# Patient Record
Sex: FEMALE | Race: WHITE | URBAN - METROPOLITAN AREA
[De-identification: names, ages, dates, MRNs, and addresses within clinical notes are randomized per-mention and may not be internally consistent; named-entity substitution may affect disease eponyms.]

---

## 2021-01-07 ENCOUNTER — HOSPITAL ENCOUNTER (OUTPATIENT)
Facility: CLINIC | Age: 46
End: 2021-01-07
Attending: OBSTETRICS & GYNECOLOGY | Admitting: OBSTETRICS & GYNECOLOGY

## 2021-01-07 DIAGNOSIS — N92.1 METRORRHAGIA: ICD-10-CM

## 2021-01-07 DIAGNOSIS — N70.11 CHRONIC SALPINGITIS: ICD-10-CM

## 2021-01-07 DIAGNOSIS — N83.291 COMPLEX CYST OF RIGHT OVARY: ICD-10-CM

## 2021-01-29 DIAGNOSIS — Z11.59 ENCOUNTER FOR SCREENING FOR OTHER VIRAL DISEASES: ICD-10-CM

## 2021-02-12 ENCOUNTER — HOSPITAL PATHOLOGY (OUTPATIENT)
Dept: OTHER | Facility: CLINIC | Age: 46
End: 2021-02-12

## 2021-02-16 LAB — COPATH REPORT: NORMAL

## 2025-07-22 ENCOUNTER — HOSPITAL ENCOUNTER (EMERGENCY)
Facility: CLINIC | Age: 50
Discharge: HOME | End: 2025-07-23
Attending: EMERGENCY MEDICINE
Payer: COMMERCIAL

## 2025-07-22 ENCOUNTER — NURSE TRIAGE (OUTPATIENT)
Dept: FAMILY MEDICINE | Facility: CLINIC | Age: 50
End: 2025-07-22
Payer: COMMERCIAL

## 2025-07-22 DIAGNOSIS — R07.9 CHEST PAIN, UNSPECIFIED TYPE: ICD-10-CM

## 2025-07-22 PROCEDURE — 82247 BILIRUBIN TOTAL: CPT | Performed by: EMERGENCY MEDICINE

## 2025-07-22 PROCEDURE — 85379 FIBRIN DEGRADATION QUANT: CPT | Performed by: EMERGENCY MEDICINE

## 2025-07-22 PROCEDURE — 36415 COLL VENOUS BLD VENIPUNCTURE: CPT | Performed by: EMERGENCY MEDICINE

## 2025-07-22 PROCEDURE — 82435 ASSAY OF BLOOD CHLORIDE: CPT | Performed by: EMERGENCY MEDICINE

## 2025-07-22 PROCEDURE — 85041 AUTOMATED RBC COUNT: CPT | Performed by: EMERGENCY MEDICINE

## 2025-07-22 PROCEDURE — 99285 EMERGENCY DEPT VISIT HI MDM: CPT | Mod: 25 | Performed by: EMERGENCY MEDICINE

## 2025-07-22 PROCEDURE — 83690 ASSAY OF LIPASE: CPT | Performed by: EMERGENCY MEDICINE

## 2025-07-22 PROCEDURE — 84484 ASSAY OF TROPONIN QUANT: CPT | Performed by: EMERGENCY MEDICINE

## 2025-07-22 ASSESSMENT — COLUMBIA-SUICIDE SEVERITY RATING SCALE - C-SSRS
2. HAVE YOU ACTUALLY HAD ANY THOUGHTS OF KILLING YOURSELF IN THE PAST MONTH?: NO
1. IN THE PAST MONTH, HAVE YOU WISHED YOU WERE DEAD OR WISHED YOU COULD GO TO SLEEP AND NOT WAKE UP?: NO
6. HAVE YOU EVER DONE ANYTHING, STARTED TO DO ANYTHING, OR PREPARED TO DO ANYTHING TO END YOUR LIFE?: NO

## 2025-07-22 NOTE — TELEPHONE ENCOUNTER
Triage Patient Outreach    Attempt # 1    Was call answered?  No.  Left voicemail to return call to [facility]    Mely Karimi RN

## 2025-07-22 NOTE — TELEPHONE ENCOUNTER
"Called patient and triaged her symptoms. She stated that she has been having constant chest pain for about a week and rated it 3/10. She stated the pain is a dull pain behind the sternum. She denies having GERD or having symptoms of GERD. She denied having any other symptoms. Patient was advised to call 911 right away. Patient stated that she does not want to do that because of the cost. Educated the patient on why we recommend calling 911. She stated that she will talk to her spouse regarding this. Appointment for tomorrow was cancelled.     Reason for Disposition   Chest pain lasting longer than 5 minutes and ANY of the following:         Pain is crushing, pressure-like, or heavy         Over 44 years old          Over 30 years old and one cardiac risk factor (e.g diabetes, high blood pressure, high cholesterol, smoker, or family history of heart disease)         History of heart disease (e.g. angina, heart attack, heart failure, bypass surgery, takes nitroglycerin)    Additional Information   Negative: Passed out (i.e., lost consciousness, collapsed and was not responding)   Negative: Shock suspected (e.g., cold/pale/clammy skin, too weak to stand, low BP, rapid pulse)   Negative: SEVERE difficulty breathing (e.g., struggling for each breath, speaks in single words)   Negative: Difficult to awaken or acting confused (e.g., disoriented, slurred speech)    Answer Assessment - Initial Assessment Questions  1. LOCATION: \"Where does it hurt?\"          Behind the sternum     2. RADIATION: \"Does the pain go anywhere else?\" (e.g., into neck, jaw, arms, back)        No    3. ONSET: \"When did the chest pain begin?\" (Minutes, hours or days)         A week and has not gotten worse    4. PATTERN: \"Does the pain come and go, or has it been constant since it started?\"  \"Does it get worse with exertion?\"         Constant dull ache    5. DURATION: \"How long does it last\" (e.g., seconds, minutes, hours)        A week    6. " "SEVERITY: \"How bad is the pain?\"  (e.g., Scale 1-10; mild, moderate, or severe)     - MILD (1-3): doesn't interfere with normal activities      - MODERATE (4-7): interferes with normal activities or awakens from sleep     - SEVERE (8-10): excruciating pain, unable to do any normal activities          3/10    7. CARDIAC RISK FACTORS: \"Do you have any history of heart problems or risk factors for heart disease?\" (e.g., angina, prior heart attack; diabetes, high blood pressure, high cholesterol, smoker, or strong family history of heart disease)        None    8. PULMONARY RISK FACTORS: \"Do you have any history of lung disease?\"  (e.g., blood clots in lung, asthma, emphysema, birth control pills)        None    9. CAUSE: \"What do you think is causing the chest pain?\"        Unsure    10. OTHER SYMPTOMS: \"Do you have any other symptoms?\" (e.g., dizziness, nausea, vomiting, sweating, fever, difficulty breathing, cough)          None    11. PREGNANCY: \"Is there any chance you are pregnant?\" \"When was your last menstrual period?\"          No    Protocols used: Chest Pain-A-[facility]    "

## 2025-07-22 NOTE — TELEPHONE ENCOUNTER
Triage Patient Outreach    Attempt # 2    Was call answered?  No.  Left voicemail to return call to [facility]    Mely Karimi RN

## 2025-07-22 NOTE — TELEPHONE ENCOUNTER
Patient is on my schedule tomorrow for chest pain. Please triage  Elba Zavala PA-C on 7/22/2025 at 10:27 AM

## 2025-07-23 ENCOUNTER — APPOINTMENT (OUTPATIENT)
Dept: GENERAL RADIOLOGY | Facility: CLINIC | Age: 50
End: 2025-07-23
Attending: EMERGENCY MEDICINE
Payer: COMMERCIAL

## 2025-07-23 VITALS
DIASTOLIC BLOOD PRESSURE: 82 MMHG | SYSTOLIC BLOOD PRESSURE: 115 MMHG | RESPIRATION RATE: 13 BRPM | WEIGHT: 125 LBS | HEART RATE: 84 BPM | OXYGEN SATURATION: 99 % | TEMPERATURE: 96.7 F | BODY MASS INDEX: 19.58 KG/M2

## 2025-07-23 LAB
ALBUMIN SERPL BCG-MCNC: 4.4 G/DL (ref 3.5–5.2)
ALP SERPL-CCNC: 59 U/L (ref 40–150)
ALT SERPL W P-5'-P-CCNC: 15 U/L (ref 0–50)
ANION GAP SERPL CALCULATED.3IONS-SCNC: 13 MMOL/L (ref 7–15)
AST SERPL W P-5'-P-CCNC: 23 U/L (ref 0–45)
ATRIAL RATE - MUSE: 76 BPM
BASOPHILS # BLD AUTO: 0.1 10E3/UL (ref 0–0.2)
BASOPHILS NFR BLD AUTO: 1 %
BILIRUB DIRECT SERPL-MCNC: <0.08 MG/DL (ref 0–0.3)
BILIRUB SERPL-MCNC: 0.2 MG/DL
BUN SERPL-MCNC: 15.6 MG/DL (ref 6–20)
CALCIUM SERPL-MCNC: 9.5 MG/DL (ref 8.8–10.4)
CHLORIDE SERPL-SCNC: 102 MMOL/L (ref 98–107)
CREAT SERPL-MCNC: 0.86 MG/DL (ref 0.51–0.95)
D DIMER PPP FEU-MCNC: <0.27 UG/ML FEU (ref 0–0.5)
DIASTOLIC BLOOD PRESSURE - MUSE: NORMAL MMHG
EGFRCR SERPLBLD CKD-EPI 2021: 82 ML/MIN/1.73M2
EOSINOPHIL # BLD AUTO: 0.2 10E3/UL (ref 0–0.7)
EOSINOPHIL NFR BLD AUTO: 3 %
ERYTHROCYTE [DISTWIDTH] IN BLOOD BY AUTOMATED COUNT: 12 % (ref 10–15)
GLUCOSE SERPL-MCNC: 133 MG/DL (ref 70–99)
HCO3 SERPL-SCNC: 25 MMOL/L (ref 22–29)
HCT VFR BLD AUTO: 37.7 % (ref 35–47)
HGB BLD-MCNC: 12.7 G/DL (ref 11.7–15.7)
HOLD SPECIMEN: NORMAL
IMM GRANULOCYTES # BLD: 0 10E3/UL
IMM GRANULOCYTES NFR BLD: 0 %
INTERPRETATION ECG - MUSE: NORMAL
LIPASE SERPL-CCNC: 49 U/L (ref 13–60)
LYMPHOCYTES # BLD AUTO: 3 10E3/UL (ref 0.8–5.3)
LYMPHOCYTES NFR BLD AUTO: 43 %
MCH RBC QN AUTO: 30.6 PG (ref 26.5–33)
MCHC RBC AUTO-ENTMCNC: 33.7 G/DL (ref 31.5–36.5)
MCV RBC AUTO: 91 FL (ref 78–100)
MONOCYTES # BLD AUTO: 0.4 10E3/UL (ref 0–1.3)
MONOCYTES NFR BLD AUTO: 6 %
NEUTROPHILS # BLD AUTO: 3.2 10E3/UL (ref 1.6–8.3)
NEUTROPHILS NFR BLD AUTO: 47 %
NRBC # BLD AUTO: 0 10E3/UL
NRBC BLD AUTO-RTO: 0 /100
P AXIS - MUSE: 46 DEGREES
PLATELET # BLD AUTO: 255 10E3/UL (ref 150–450)
POTASSIUM SERPL-SCNC: 3.9 MMOL/L (ref 3.4–5.3)
PR INTERVAL - MUSE: 148 MS
PROT SERPL-MCNC: 7.1 G/DL (ref 6.4–8.3)
QRS DURATION - MUSE: 82 MS
QT - MUSE: 368 MS
QTC - MUSE: 414 MS
R AXIS - MUSE: 91 DEGREES
RBC # BLD AUTO: 4.15 10E6/UL (ref 3.8–5.2)
SODIUM SERPL-SCNC: 140 MMOL/L (ref 135–145)
SYSTOLIC BLOOD PRESSURE - MUSE: NORMAL MMHG
T AXIS - MUSE: 87 DEGREES
TROPONIN T SERPL HS-MCNC: <6 NG/L
VENTRICULAR RATE- MUSE: 76 BPM
WBC # BLD AUTO: 6.8 10E3/UL (ref 4–11)

## 2025-07-23 PROCEDURE — 71046 X-RAY EXAM CHEST 2 VIEWS: CPT

## 2025-07-23 PROCEDURE — 250N000013 HC RX MED GY IP 250 OP 250 PS 637: Performed by: EMERGENCY MEDICINE

## 2025-07-23 RX ORDER — KETOROLAC TROMETHAMINE 15 MG/ML
10 INJECTION, SOLUTION INTRAMUSCULAR; INTRAVENOUS ONCE
Status: DISCONTINUED | OUTPATIENT
Start: 2025-07-23 | End: 2025-07-23

## 2025-07-23 RX ORDER — IBUPROFEN 600 MG/1
600 TABLET, FILM COATED ORAL ONCE
Status: COMPLETED | OUTPATIENT
Start: 2025-07-23 | End: 2025-07-23

## 2025-07-23 RX ORDER — ACETAMINOPHEN 500 MG
1000 TABLET ORAL ONCE
Status: COMPLETED | OUTPATIENT
Start: 2025-07-23 | End: 2025-07-23

## 2025-07-23 RX ADMIN — ACETAMINOPHEN 1000 MG: 500 TABLET, FILM COATED ORAL at 03:20

## 2025-07-23 RX ADMIN — IBUPROFEN 600 MG: 600 TABLET ORAL at 03:24

## 2025-07-23 ASSESSMENT — ACTIVITIES OF DAILY LIVING (ADL)
ADLS_ACUITY_SCORE: 41

## 2025-07-23 NOTE — ED TRIAGE NOTES
Pt reports chest pain for past two days, described as dull ache in the middle of her chest. Pt notes SOB that started today, worse when lying down or notices it's hard to take a deep breath.      Triage Assessment (Adult)       Row Name 07/22/25 4027          Triage Assessment    Airway WDL WDL        Respiratory WDL    Respiratory WDL X;all     Rhythm/Pattern, Respiratory shortness of breath        Cardiac WDL    Cardiac WDL X;chest pain        Chest Pain Assessment    Chest Pain Location midsternal     Character aching        Peripheral/Neurovascular WDL    Peripheral Neurovascular WDL WDL        Cognitive/Neuro/Behavioral WDL    Cognitive/Neuro/Behavioral WDL WDL

## 2025-07-23 NOTE — DISCHARGE INSTRUCTIONS
As we discussed, the exact cause of your chest discomfort is not clear.  At this time I do not see any emergent concerns.  Please follow-up with your doctor in clinic for a recheck, and please return to the ER if you have worsening pain, difficulty breathing, or any further concerns.

## 2025-07-23 NOTE — ED PROVIDER NOTES
Emergency Department Note      History of Present Illness   Chief Complaint   Chest Pain and Shortness of Breath      HPI   Iris Lopez is a 50 year old female who presents to the ED with her family for evaluation of chest pain and shortness of breath. She reports constant centralized chest pain for the past week but had worsening pain tonight along with a slight cough. She states having a sharp episode of chest pain a few days before her onset but it subsided on it's own. Her pain currently is a dull achy pain with a rating of 4/10. She has more pain when she takes a deep breath. Patient has family history of PE. She took Excedrin and Aspirin last week. No medications today. No changes with eating or drinking. No sore throat or abdominal pain. No family history of heart disease.     Independent Historian   None      Past Medical History   Medical History and Problem List   No pertinent past medical history     Medications   The patient is not currently taking any prescribed medications.  Physical Exam     Patient Vitals for the past 24 hrs:   BP Temp Temp src Pulse Resp SpO2 Weight   07/23/25 0434 115/82 -- -- 84 13 99 % --   07/23/25 0222 -- -- -- 77 15 95 % --   07/23/25 0220 124/79 -- -- 69 -- -- --   07/22/25 2344 136/83 (!) 96.7  F (35.9  C) Temporal 80 20 100 % 56.7 kg (125 lb)     Physical Exam  General: Does not appear in acute distress  Head: No signs of trauma.   CV: Normal rate and regular rhythm.    Resp: Effort normal and breath sounds normal. No respiratory distress.   GI: Soft. There is no tenderness.  No rebound or guarding.  Normal bowel sounds.  No CVA tenderness.  MSK: Normal range of motion. no edema. No Calf tenderness.  Neuro: The patient is alert and oriented. Speech normal.  Skin: Skin is warm and dry. No rash noted.   Psych: normal mood and affect. behavior is normal.       Diagnostics   Lab Results   Labs Ordered and Resulted from Time of ED Arrival to Time of ED Departure   BASIC  METABOLIC PANEL - Abnormal       Result Value    Sodium 140      Potassium 3.9      Chloride 102      Carbon Dioxide (CO2) 25      Anion Gap 13      Urea Nitrogen 15.6      Creatinine 0.86      GFR Estimate 82      Calcium 9.5      Glucose 133 (*)    TROPONIN T, HIGH SENSITIVITY - Normal    Troponin T, High Sensitivity <6     HEPATIC FUNCTION PANEL - Normal    Protein Total 7.1      Albumin 4.4      Bilirubin Total 0.2      Alkaline Phosphatase 59      AST 23      ALT 15      Bilirubin Direct <0.08     LIPASE - Normal    Lipase 49     D DIMER QUANTITATIVE - Normal    D-Dimer Quantitative <0.27     CBC WITH PLATELETS AND DIFFERENTIAL    WBC Count 6.8      RBC Count 4.15      Hemoglobin 12.7      Hematocrit 37.7      MCV 91      MCH 30.6      MCHC 33.7      RDW 12.0      Platelet Count 255      % Neutrophils 47      % Lymphocytes 43      % Monocytes 6      % Eosinophils 3      % Basophils 1      % Immature Granulocytes 0      NRBCs per 100 WBC 0      Absolute Neutrophils 3.2      Absolute Lymphocytes 3.0      Absolute Monocytes 0.4      Absolute Eosinophils 0.2      Absolute Basophils 0.1      Absolute Immature Granulocytes 0.0      Absolute NRBCs 0.0       Imaging   XR Chest 2 Views   Final Result   IMPRESSION: Normal heart size and pulmonary vascularity. No acute infiltrates or consolidation. Prominent nipple shadows bilaterally. Bony structures are unremarkable. No acute findings.        EKG   ECG taken at 2349, ECG read at 0256  Normal sinus rhythm  Rightward axis   Rate 76 bpm. WV interval 148 ms. QRS duration 82 ms. QT/QTc 368/414 ms. P-R-T axes 46 91 87.     Independent Interpretation   On my independent interpretation of CXR there is no pneumothorax     ED Course    Medications Administered   Medications   acetaminophen (TYLENOL) tablet 1,000 mg (1,000 mg Oral $Given 7/23/25 0320)   ibuprofen (ADVIL/MOTRIN) tablet 600 mg (600 mg Oral $Given 7/23/25 0324)     Procedures   Procedures     Discussion of Management    None    ED Course   ED Course as of 07/23/25 0614   Wed Jul 23, 2025   0257 I obtained history and examined the patient as noted above.    0428 I rechecked the patient and explained findings.    0431 I prepared the patient to be discharged home.      Additional Documentation  None  Medical Decision Making / Diagnosis   JAMES Lopez is a 50 year old female presenting with chest discomfort.  She reports that she has had this over the last week.  It was somewhat worse tonight so she came to the hospital.  On my evaluation she had a reassuring physical exam and she had appropriate vital signs.  EKG did not show any concerning ST segment changes or arrhythmias.  Blood was obtained including a troponin which was undetectable and a D-dimer which was negative.  Chest x-ray did not show any concerning findings.  Patient reports that she had a cough earlier, but this is overall improved.  Patient was given the above medications and did have improvement of her symptoms.  I discussed that at this time I do not see any clear signs of an acute process.  Patient is low risk on the heart score.  I discussed alternative diagnosis such as costochondritis which could be caused by a viral illness, but discussed that there is no specific test for this.  Given the reassuring workup, felt the patient was appropriate for discharge home recommend her follow-up with her doctor in clinic.  She was given return precautions.    Disposition   The patient was discharged.     Diagnosis     ICD-10-CM    1. Chest pain, unspecified type  R07.9            Discharge Medications   Discharge Medication List as of 7/23/2025  4:33 AM        Scribe Disclosure:  I, Dominga Hicks, am serving as a scribe at 3:04 AM on 7/23/2025 to document services personally performed by Yariel Williamson MD based on my observations and the provider's statements to me.      Yariel Williamson MD  07/23/25 0677